# Patient Record
Sex: FEMALE | Race: WHITE | NOT HISPANIC OR LATINO | Employment: OTHER | URBAN - METROPOLITAN AREA
[De-identification: names, ages, dates, MRNs, and addresses within clinical notes are randomized per-mention and may not be internally consistent; named-entity substitution may affect disease eponyms.]

---

## 2023-11-07 ENCOUNTER — HOSPITAL ENCOUNTER (OUTPATIENT)
Dept: RADIOLOGY | Facility: HOSPITAL | Age: 68
Discharge: HOME | End: 2023-11-07
Payer: MEDICARE

## 2023-11-07 DIAGNOSIS — H71.22 CHOLESTEATOMA OF MASTOID, LEFT EAR: ICD-10-CM

## 2023-11-07 DIAGNOSIS — H65.32 CHRONIC MUCOID OTITIS MEDIA, LEFT EAR: ICD-10-CM

## 2023-11-07 PROCEDURE — 70480 CT ORBIT/EAR/FOSSA W/O DYE: CPT

## 2023-11-07 PROCEDURE — 70480 CT ORBIT/EAR/FOSSA W/O DYE: CPT | Performed by: RADIOLOGY

## 2023-12-23 ENCOUNTER — APPOINTMENT (OUTPATIENT)
Dept: RADIOLOGY | Facility: HOSPITAL | Age: 68
End: 2023-12-23
Payer: MEDICARE

## 2023-12-23 ENCOUNTER — HOSPITAL ENCOUNTER (EMERGENCY)
Facility: HOSPITAL | Age: 68
Discharge: HOME | End: 2023-12-23
Payer: MEDICARE

## 2023-12-23 VITALS
BODY MASS INDEX: 22.73 KG/M2 | RESPIRATION RATE: 16 BRPM | OXYGEN SATURATION: 98 % | TEMPERATURE: 99.1 F | WEIGHT: 150 LBS | SYSTOLIC BLOOD PRESSURE: 136 MMHG | DIASTOLIC BLOOD PRESSURE: 88 MMHG | HEIGHT: 68 IN | HEART RATE: 82 BPM

## 2023-12-23 DIAGNOSIS — M25.462 KNEE EFFUSION, LEFT: Primary | ICD-10-CM

## 2023-12-23 LAB
BASOPHILS NFR FLD MANUAL: 0 %
BLASTS NFR FLD MANUAL: 0 %
CLARITY FLD: ABNORMAL
COLOR FLD: ABNORMAL
EOSINOPHIL NFR FLD MANUAL: 0 %
IMMATURE GRANULOCYTES IN FLUID: 0 %
LYMPHOCYTES NFR FLD MANUAL: 1 %
MONOS+MACROS NFR FLD MANUAL: 6 %
NEUTROPHILS NFR FLD MANUAL: 93 %
OTHER CELLS NFR FLD MANUAL: 0 %
PLASMA CELLS NFR FLD MANUAL: 0 %
RBC # FLD AUTO: 1000 /UL
TOTAL CELLS COUNTED FLD: 100
WBC # FLD MANUAL: ABNORMAL /UL

## 2023-12-23 PROCEDURE — 99284 EMERGENCY DEPT VISIT MOD MDM: CPT

## 2023-12-23 PROCEDURE — 2500000004 HC RX 250 GENERAL PHARMACY W/ HCPCS (ALT 636 FOR OP/ED): Performed by: NURSE PRACTITIONER

## 2023-12-23 PROCEDURE — 73562 X-RAY EXAM OF KNEE 3: CPT | Mod: LT

## 2023-12-23 PROCEDURE — 20610 DRAIN/INJ JOINT/BURSA W/O US: CPT | Mod: LT | Performed by: NURSE PRACTITIONER

## 2023-12-23 PROCEDURE — 99284 EMERGENCY DEPT VISIT MOD MDM: CPT | Mod: 25 | Performed by: NURSE PRACTITIONER

## 2023-12-23 PROCEDURE — 89051 BODY FLUID CELL COUNT: CPT | Performed by: NURSE PRACTITIONER

## 2023-12-23 PROCEDURE — 2500000005 HC RX 250 GENERAL PHARMACY W/O HCPCS: Performed by: NURSE PRACTITIONER

## 2023-12-23 PROCEDURE — 73562 X-RAY EXAM OF KNEE 3: CPT | Mod: LEFT SIDE | Performed by: RADIOLOGY

## 2023-12-23 RX ORDER — LIDOCAINE 50 MG/G
1 PATCH TOPICAL DAILY
Qty: 7 PATCH | Refills: 0 | Status: SHIPPED | OUTPATIENT
Start: 2023-12-23 | End: 2023-12-30

## 2023-12-23 RX ORDER — ACETAMINOPHEN 500 MG
1000 TABLET ORAL 2 TIMES DAILY
Qty: 28 TABLET | Refills: 0 | Status: SHIPPED | OUTPATIENT
Start: 2023-12-23 | End: 2023-12-30

## 2023-12-23 RX ORDER — LIDOCAINE 560 MG/1
1 PATCH PERCUTANEOUS; TOPICAL; TRANSDERMAL ONCE
Status: DISCONTINUED | OUTPATIENT
Start: 2023-12-23 | End: 2023-12-23 | Stop reason: HOSPADM

## 2023-12-23 RX ORDER — PREDNISONE 20 MG/1
20 TABLET ORAL ONCE
Status: COMPLETED | OUTPATIENT
Start: 2023-12-23 | End: 2023-12-23

## 2023-12-23 RX ORDER — ACETAMINOPHEN 325 MG/1
1000 TABLET ORAL ONCE
Status: COMPLETED | OUTPATIENT
Start: 2023-12-23 | End: 2023-12-23

## 2023-12-23 RX ORDER — PREDNISONE 10 MG/1
20 TABLET ORAL DAILY
Qty: 10 TABLET | Refills: 0 | Status: SHIPPED | OUTPATIENT
Start: 2023-12-23 | End: 2023-12-28

## 2023-12-23 RX ADMIN — LIDOCAINE 1 PATCH: 4 PATCH TOPICAL at 17:29

## 2023-12-23 RX ADMIN — ACETAMINOPHEN 975 MG: 325 TABLET ORAL at 17:28

## 2023-12-23 RX ADMIN — PREDNISONE 20 MG: 20 TABLET ORAL at 15:56

## 2023-12-23 ASSESSMENT — COLUMBIA-SUICIDE SEVERITY RATING SCALE - C-SSRS
1. IN THE PAST MONTH, HAVE YOU WISHED YOU WERE DEAD OR WISHED YOU COULD GO TO SLEEP AND NOT WAKE UP?: NO
6. HAVE YOU EVER DONE ANYTHING, STARTED TO DO ANYTHING, OR PREPARED TO DO ANYTHING TO END YOUR LIFE?: NO
2. HAVE YOU ACTUALLY HAD ANY THOUGHTS OF KILLING YOURSELF?: NO

## 2023-12-23 ASSESSMENT — PAIN DESCRIPTION - ORIENTATION
ORIENTATION: LEFT
ORIENTATION: LEFT

## 2023-12-23 ASSESSMENT — PAIN DESCRIPTION - LOCATION
LOCATION: KNEE
LOCATION: KNEE

## 2023-12-23 ASSESSMENT — PAIN SCALES - GENERAL
PAINLEVEL_OUTOF10: 6
PAINLEVEL_OUTOF10: 6
PAINLEVEL_OUTOF10: 10 - WORST POSSIBLE PAIN

## 2023-12-23 ASSESSMENT — PAIN - FUNCTIONAL ASSESSMENT
PAIN_FUNCTIONAL_ASSESSMENT: 0-10
PAIN_FUNCTIONAL_ASSESSMENT: 0-10

## 2023-12-23 ASSESSMENT — PAIN DESCRIPTION - FREQUENCY: FREQUENCY: CONSTANT/CONTINUOUS

## 2023-12-23 ASSESSMENT — PAIN DESCRIPTION - PAIN TYPE: TYPE: ACUTE PAIN

## 2023-12-23 ASSESSMENT — PAIN DESCRIPTION - DESCRIPTORS: DESCRIPTORS: SHARP;SHOOTING

## 2023-12-23 NOTE — ED PROVIDER NOTES
HPI   Chief Complaint   Patient presents with    Joint Swelling     Left knee pain, swelling       68-year-old female presents today with left knee swelling and pain.  She has a history of osteoarthritis.  She has been to a rheumatologist and told that this is simply osteoarthritis and when she develops this type of pain usually prednisone works.  She denies any recent trauma or fall but endorses swelling.  She denies any change in skin temperature or color.  She denies limited range of motion.  She is able to ambulate but that increases her pain.  She denies fever, headache, chest pain, dyspnea, abdominal pain, nausea or vomiting.      History provided by:  Patient and spouse   used: No                        [place] Coma Scale Score: 15                  Patient History   Past Medical History:   Diagnosis Date    Personal history of other diseases of the musculoskeletal system and connective tissue     History of arthritis    Unspecified sensorineural hearing loss 01/17/2018    Sensorineural hearing loss (SNHL) of right ear     Past Surgical History:   Procedure Laterality Date    CT ANGIO NECK W AND WO IV CONTRAST  9/17/2022    CT NECK ANGIO W AND WO IV CONTRAST 9/17/2022 [facility]    CT HEAD ANGIO W AND WO IV CONTRAST  9/17/2022    CT HEAD ANGIO W AND WO IV CONTRAST 9/17/2022 [facility]    OOPHORECTOMY  10/10/2016    Oophorectomy    OTHER SURGICAL HISTORY  10/10/2016    Myringotomy    RHINOPLASTY  10/10/2016    Rhinoplasty    TONSILLECTOMY  10/10/2016    Tonsillectomy     No family history on file.  Social History     Tobacco Use    Smoking status: Not on file    Smokeless tobacco: Not on file   Substance Use Topics    Alcohol use: Not on file    Drug use: Not on file       Physical Exam   ED Triage Vitals [12/23/23 1529]   Temp Heart Rate Resp BP   37.3 °C (99.1 °F) 86 18 (!) 142/93      SpO2 Temp Source Heart Rate Source Patient Position   96 % Tympanic -- --      BP  Location FiO2 (%)     -- --       Physical Exam  Constitutional:       Appearance: Normal appearance.   HENT:      Head: Normocephalic and atraumatic.      Nose: Nose normal.      Mouth/Throat:      Mouth: Mucous membranes are moist.   Eyes:      Pupils: Pupils are equal, round, and reactive to light.   Cardiovascular:      Rate and Rhythm: Normal rate and regular rhythm.      Pulses: Normal pulses.      Heart sounds: Normal heart sounds.   Pulmonary:      Effort: Pulmonary effort is normal.      Breath sounds: Normal breath sounds.   Abdominal:      General: Abdomen is flat.      Palpations: Abdomen is soft.   Musculoskeletal:         General: Swelling and tenderness present. Normal range of motion.      Cervical back: Normal range of motion.      Comments: Left knee swelling and tenderness   Skin:     General: Skin is warm.      Capillary Refill: Capillary refill takes less than 2 seconds.   Neurological:      General: No focal deficit present.      Mental Status: She is oriented to person, place, and time.   Psychiatric:         Mood and Affect: Mood normal.         Behavior: Behavior normal.         ED Course & MDM   Diagnoses as of 12/23/23 1720   Knee effusion, left       Medical Decision Making  Arthrocentesis completed and I pulled approximately 60 cc of clear yellow fluid.  Fluid was sent for cell count.  Patient received prednisone she was placed on prednisone for 5 days 20 mg daily.  I requested appointment with orthopedic surgery for outpatient follow-up.  Patient was ambulating under her own strength.  She did not appear to be in acute distress.  She can also use lidocaine 5% prescription patches for pain.  Return precautions reviewed and safely discharged home.  The x-ray of her knee showed advanced left knee osteoarthritis with sizable joint effusion.  There was no acute osseous abnormality.    Amount and/or Complexity of Data Reviewed  External Data Reviewed: labs.  Radiology: ordered and independent  interpretation performed.        Procedure  Arthrocentesis    Performed by: ENOCH Wharton  Authorized by: ENOCH Wharton    Consent:     Consent obtained:  Verbal    Consent given by:  Patient and spouse    Risks, benefits, and alternatives were discussed: yes      Risks discussed:  Bleeding, infection, pain and incomplete drainage    Alternatives discussed:  No treatment, delayed treatment, alternative treatment, observation and referral  Universal protocol:     Procedure explained and questions answered to patient or proxy's satisfaction: yes      Patient identity confirmed:  Verbally with patient  Location:     Location:  Knee    Knee:  L knee  Anesthesia:     Anesthesia method:  Local infiltration    Local anesthetic:  Lidocaine 1% w/o epi  Procedure details:     Needle gauge:  18 G    Ultrasound guidance: no      Approach:  Lateral    Aspirate characteristics:  Clear and yellow    Steroid injected: no      Specimen collected: yes    Post-procedure details:     Dressing:  Adhesive bandage    Procedure completion:  Tolerated       ENOCH Wharton  12/23/23 7132

## 2024-01-17 ENCOUNTER — TELEPHONE (OUTPATIENT)
Dept: OTOLARYNGOLOGY | Facility: HOSPITAL | Age: 69
End: 2024-01-17

## 2024-02-08 ENCOUNTER — TELEMEDICINE (OUTPATIENT)
Dept: OTOLARYNGOLOGY | Facility: CLINIC | Age: 69
End: 2024-02-08
Payer: MEDICARE

## 2024-02-08 DIAGNOSIS — Z86.69 HISTORY OF CHOLESTEATOMA: ICD-10-CM

## 2024-02-08 DIAGNOSIS — H90.12 CONDUCTIVE HEARING LOSS OF LEFT EAR WITH UNRESTRICTED HEARING OF RIGHT EAR: Primary | ICD-10-CM

## 2024-02-08 PROCEDURE — 99213 OFFICE O/P EST LOW 20 MIN: CPT | Performed by: OTOLARYNGOLOGY

## 2024-02-08 PROCEDURE — 1125F AMNT PAIN NOTED PAIN PRSNT: CPT | Performed by: OTOLARYNGOLOGY

## 2024-04-05 ENCOUNTER — OFFICE VISIT (OUTPATIENT)
Dept: OTOLARYNGOLOGY | Facility: CLINIC | Age: 69
End: 2024-04-05
Payer: MEDICARE

## 2024-04-05 VITALS — BODY MASS INDEX: 22.73 KG/M2 | HEIGHT: 68 IN | WEIGHT: 150 LBS

## 2024-04-05 DIAGNOSIS — H90.12 CONDUCTIVE HEARING LOSS OF LEFT EAR WITH UNRESTRICTED HEARING OF RIGHT EAR: ICD-10-CM

## 2024-04-05 DIAGNOSIS — Z86.69 HISTORY OF CHOLESTEATOMA: Primary | ICD-10-CM

## 2024-04-05 PROCEDURE — 1160F RVW MEDS BY RX/DR IN RCRD: CPT | Performed by: OTOLARYNGOLOGY

## 2024-04-05 PROCEDURE — 99213 OFFICE O/P EST LOW 20 MIN: CPT | Performed by: OTOLARYNGOLOGY

## 2024-04-05 PROCEDURE — 1036F TOBACCO NON-USER: CPT | Performed by: OTOLARYNGOLOGY

## 2024-04-05 PROCEDURE — 1159F MED LIST DOCD IN RCRD: CPT | Performed by: OTOLARYNGOLOGY

## 2024-04-05 RX ORDER — LATANOPROST 50 UG/ML
1 SOLUTION/ DROPS OPHTHALMIC
COMMUNITY
Start: 2024-03-22

## 2024-04-05 ASSESSMENT — PATIENT HEALTH QUESTIONNAIRE - PHQ9
SUM OF ALL RESPONSES TO PHQ9 QUESTIONS 1 AND 2: 0
2. FEELING DOWN, DEPRESSED OR HOPELESS: NOT AT ALL
1. LITTLE INTEREST OR PLEASURE IN DOING THINGS: NOT AT ALL

## 2024-04-05 NOTE — PROGRESS NOTES
History of present illness:  Yani Andrea is a 68 y.o. female presenting today for follow-up.  Patient reports she her hearing has not improved and mention an episode of ear infection.    RECALL 02/08/2024  Yani Andrea is a 68 y.o. female presenting today for follow-up. She was last seen by me in 2018. She developed ear drainage and otalgia and was subsequently seen by Dr Cronin who treated her with eardrops.      RECALL 01/28/2018  Mrs Andrea is returning today for a follow-up visit. She is status post left tympanomastoidectomy with ossiculoplasty. She is complaining of left-sided tinnitus and decreased hearing. She denies any pain or discharge. Her taste disturbance has improved since her last visit. It appears that the initial improvement noticed in her hearing disappeared after about 3 weeks. She did mention that when yawning and putting her head down to certain position improves her hearing.       The patient's current medications, active allergies and list of medical problems were reviewed in the EHR and confirmed electronically there are as follows;  Allergies:   No Known Allergies  Current list of medications:   No current outpatient medications on file.     No current facility-administered medications for this visit.     Problem list:  There is no problem list on file for this patient.        Physical Examination:  CONSTITUTIONAL:  No acute distress  VOICE:  No hoarseness or other abnormality  RESPIRATION:  Breathing comfortably, no stridor  CV:  No clubbing/cyanosis/edema in hands  EYES:  EOM intact, sclera clear  NEURO:  Alert and oriented times 3, Cranial nerves II-XII grossly intact and symmetric bilaterally  HEAD AND FACE:  Symmetric facial features, no masses or lesions  RIGHT EAR:  Normal external ear and post auricular area, no visible lesions, external auditory canal patent, tympanic membrane intact, no retraction, no signs of mass, effusion, or infection within the middle ear  LEFT EAR: Graft  intact, cartilage in good placement, slight retraction anteriorly.  NOSE:  Anterior rhinoscopy clear, no significant external deformity.  ORAL CAVITY/OROPHARYNX/LIPS:  normal lining, mobile tongue.  PHARYNGEAL WALLS: Moist mucosal lining, good palatal elevation  NECK/LYMPH:  No LAD, no thyroid masses, trachea midline  SKIN:  Neck and facial skin is without scar or injury  PSYCH:  Alert and oriented with appropriate mood and affect    Diagnostic testing:  Audiometric testing:  On the right: mild to moderate mixed hearing loss  On the left: moderate to moderately severe mixed hearing loss  Speech discrimination is excellent.    CT temporal bone findings reveal:  Good placement, no cholesteatoma    I personally reviewed the available patient's external record and independently reviewed their audiometric testing [and] radiographic imaging through the appropriate viewing software as detailed in my note and agree with the detailed report.      Impression:  Left chronic otitis media  Left mixed hearing loss    Recommendation:  Doing well. I recommended hearing amplification and gave her the paper work for hearing aids. She is cleared for hearing amplification. Follow-up in a year.    I discussed with the patient the complexity of my medical decision making including the treatment and testing rational, indications of their elective procedure and possible adverse effects and/or complications. Based on the provided documentation and my professional assessment of this patient's [acute condition/acute exacerbation of their chronic condition/newly diagnosed condition/progression of their condition/complicated course of their medical condition], the complexity of evaluation and treatment is [low-moderate-high].    This note was created using speech recognition transcription software. Despite proofreading, several typographical errors might be present that might affect the meaning of the content. Please call with any  questions.      Scribe Attestation  By signing my name below, I, Rubia Castillo, Scrib[place]ley   attest that this documentation has been prepared under the direction and in the presence of Yusuf Botello MD.

## 2025-06-04 NOTE — PROGRESS NOTES
History of present illness:  Yani Andrea is a 68 y.o. female presenting today for follow-up. She was last seen by me in 2018. She developed ear drainage and otalgia and was subsequently seen by Dr Cronin who treated her with eardrops.     RECALL 01/28/2018  Mrs Andrea is returning today for a follow-up visit. She is status post left tympanomastoidectomy with ossiculoplasty. She is complaining of left-sided tinnitus and decreased hearing. She denies any pain or discharge. Her taste disturbance has improved since her last visit. It appears that the initial improvement noticed in her hearing disappeared after about 3 weeks. She did mention that when yawning and putting her head down to certain position improves her hearing.     The patient's current medications, active allergies and list of medical problems were reviewed in the EHR and confirmed electronically there are as follows;  Allergies:   No Known Allergies  Current list of medications:   No current outpatient medications on file.     No current facility-administered medications for this visit.     Problem list:  There is no problem list on file for this patient.        Physical Examination:This is a virtual visit.      Diagnostic testing:  Audiometry:  Audiometry testing done on 09/2023 reveals:  On the right: Mild mixed hearing loss. Type C tympanogram.  On the left: mild  to moderate mixed hearing loss. Good closure of the air bone gap in the mid and low frequency range. Type B tympanogram.   Excellent speech discrimination bilaterally.      CT IAC findings from 11/07/2023  reveal:  1. Right temporal bone:    The mastoid air cells and middle ear clear.      2. Left temporal bone:    Postsurgical changes of a canal wall up mastoidectomy. There is  opacification of the remaining mastoid air cells and partial  opacification of the mastoidectomy defect.      Mild debris within the external auditory canal is nonspecific.      There is diffuse thickening of the  tympanic membrane.      There is an ossicular chain prosthesis extending from the tympanic  membrane to the oval window.The prosthesis is in good placement.    There is mild nonspecific patchy opacification of the middle ear.    I personally reviewed the available patient's external record and independently reviewed their audiometric testing [and] radiographic imaging through the appropriate viewing software as detailed in my note and agree with the detailed report.      Impression:  Left chronic otitis media  Left mixed hearing loss    Recommendation:  Return for ear cleaning and then I will clear her for hearing amplification. Today's radiologic testing does not  support the presence of a  cholesteatoma.    Scribe Attestation  By signing my name below, IRubia Scrlizzette   attest that this documentation has been prepared under the direction and in the presence of Yusuf Botello MD.     Syeda